# Patient Record
(demographics unavailable — no encounter records)

---

## 2024-11-12 NOTE — PHYSICAL EXAM
[Normal] : no acute distress, well nourished, well developed and well-appearing [Normal Sclera/Conjunctiva] : normal sclera/conjunctiva [Coordination Grossly Intact] : coordination grossly intact [Alert and Oriented x3] : oriented to person, place, and time

## 2024-11-13 NOTE — HEALTH RISK ASSESSMENT
[Good] : ~his/her~  mood as  good [Never] : Never [No] : In the past 12 months have you used drugs other than those required for medical reasons? No [No falls in past year] : Patient reported no falls in the past year [0] : 2) Feeling down, depressed, or hopeless: Not at all (0) [PHQ-2 Negative - No further assessment needed] : PHQ-2 Negative - No further assessment needed [Patient reported PAP Smear was normal] : Patient reported PAP Smear was normal [Fully functional (bathing, dressing, toileting, transferring, walking, feeding)] : Fully functional (bathing, dressing, toileting, transferring, walking, feeding) [Fully functional (using the telephone, shopping, preparing meals, housekeeping, doing laundry, using] : Fully functional and needs no help or supervision to perform IADLs (using the telephone, shopping, preparing meals, housekeeping, doing laundry, using transportation, managing medications and managing finances) [[id number]] : 0 [Change in mental status noted] : No change in mental status noted [Language] : denies difficulty with language [Behavior] : denies difficulty with behavior [Learning/Retaining New Information] : denies difficulty learning/retaining new information [Handling Complex Tasks] : denies difficulty handling complex tasks [Reasoning] : denies difficulty with reasoning [Spatial Ability and Orientation] : denies difficulty with spatial ability and orientation [PapSmearDate] : 2022

## 2024-11-13 NOTE — HISTORY OF PRESENT ILLNESS
[de-identified] : 38 y/o obese female presents to establish care and address multiple health issues. Sees therapist for GISELA, panic d/o. No on meds (except PRN BB for performance anxiety which helps). Doesn't think "they gel". Has HTN, now well controlled on Norvasc. Has had high lipids since childhood, before she "gained all the weight." Saw bariatric specialist who started Metformin but she didn't find it helpful.   Sees HS specialist at [facility] takes Levaquin for flares.

## 2024-11-13 NOTE — HISTORY OF PRESENT ILLNESS
[de-identified] : 40 y/o obese female presents to establish care and address multiple health issues. Sees therapist for GISELA, panic d/o. No on meds (except PRN BB for performance anxiety which helps). Doesn't think "they gel". Has HTN, now well controlled on Norvasc. Has had high lipids since childhood, before she "gained all the weight." Saw bariatric specialist who started Metformin but she didn't find it helpful.   Sees HS specialist at [facility] takes Levaquin for flares.

## 2024-11-13 NOTE — HEALTH RISK ASSESSMENT
[Good] : ~his/her~  mood as  good [Never] : Never [No] : In the past 12 months have you used drugs other than those required for medical reasons? No [No falls in past year] : Patient reported no falls in the past year [0] : 2) Feeling down, depressed, or hopeless: Not at all (0) [PHQ-2 Negative - No further assessment needed] : PHQ-2 Negative - No further assessment needed [Patient reported PAP Smear was normal] : Patient reported PAP Smear was normal [Fully functional (bathing, dressing, toileting, transferring, walking, feeding)] : Fully functional (bathing, dressing, toileting, transferring, walking, feeding) [Fully functional (using the telephone, shopping, preparing meals, housekeeping, doing laundry, using] : Fully functional and needs no help or supervision to perform IADLs (using the telephone, shopping, preparing meals, housekeeping, doing laundry, using transportation, managing medications and managing finances) [[id number]] : 0 [Change in mental status noted] : No change in mental status noted [Language] : denies difficulty with language [Behavior] : denies difficulty with behavior [Learning/Retaining New Information] : denies difficulty learning/retaining new information [Handling Complex Tasks] : denies difficulty handling complex tasks [Reasoning] : denies difficulty with reasoning [Spatial Ability and Orientation] : denies difficulty with spatial ability and orientation [PapSmearDate] : 2022

## 2024-11-13 NOTE — ASSESSMENT
[FreeTextEntry1] : Refer to new therapist. Start GLP-1 agonist. F/u 8 weeks. BP good. Will do labs at next visit.